# Patient Record
Sex: FEMALE | Race: WHITE | NOT HISPANIC OR LATINO | Employment: STUDENT | URBAN - METROPOLITAN AREA
[De-identification: names, ages, dates, MRNs, and addresses within clinical notes are randomized per-mention and may not be internally consistent; named-entity substitution may affect disease eponyms.]

---

## 2020-03-25 ENCOUNTER — TELEPHONE (OUTPATIENT)
Dept: OPHTHALMOLOGY | Facility: CLINIC | Age: 25
End: 2020-03-25

## 2020-05-27 ENCOUNTER — OFFICE VISIT (OUTPATIENT)
Dept: OPHTHALMOLOGY | Facility: CLINIC | Age: 25
End: 2020-05-27
Payer: MEDICAID

## 2020-05-27 DIAGNOSIS — H18.603 KERATOCONUS OF BOTH EYES: Primary | ICD-10-CM

## 2020-05-27 PROCEDURE — 92004 PR EYE EXAM, NEW PATIENT,COMPREHESV: ICD-10-PCS | Mod: S$PBB,,, | Performed by: OPHTHALMOLOGY

## 2020-05-27 PROCEDURE — 99212 OFFICE O/P EST SF 10 MIN: CPT | Mod: PBBFAC | Performed by: OPHTHALMOLOGY

## 2020-05-27 PROCEDURE — 92004 COMPRE OPH EXAM NEW PT 1/>: CPT | Mod: S$PBB,,, | Performed by: OPHTHALMOLOGY

## 2020-05-27 PROCEDURE — 99999 PR PBB SHADOW E&M-EST. PATIENT-LVL II: ICD-10-PCS | Mod: PBBFAC,,, | Performed by: OPHTHALMOLOGY

## 2020-05-27 PROCEDURE — 99999 PR PBB SHADOW E&M-EST. PATIENT-LVL II: CPT | Mod: PBBFAC,,, | Performed by: OPHTHALMOLOGY

## 2020-05-27 NOTE — PROGRESS NOTES
HPI     Patient here for keratoconus eval os. Patient states that she had   cross-linking done on right eye in 2018, which she believes has helped   with stabilizing vision.    NO EYE MEDS.    NO PREVIOUS EYE SX.    Last edited by Lolly García on 5/27/2020  2:57 PM. (History)            Assessment /Plan     For exam results, see Encounter Report.    Keratoconus of both eyes      S/P CXL OD at [facility] 2018  Progression OS so plan CXL OS soon.    The diagnosis of keratoconus and its etiology and clinical course were discussed.  Treatment with the use of specialty contact lenses, collagen cross linking, and corneal transplantation was explained in detail. This patient has never had LASIK.    This patient exhibits signs of progression of keratoconus and failure of conservative treatments with spectacles and/or contact lenses.   Disease progression is indicated by   - An increase of >1D in the Kmax  - An increase of >1D of astigmatism in the MRx  - An increase in myopia of >0.50D on MRx    I recommend treatment with Collagen Crosslinking using the [employer] FDA approved epi-off protocol with Photrexa and the KXL System, in order to stabilize this condition.    Plan:   KXL treatment OS  Has [place] Medicaid  Need Pentacam    I have informed the patient that we will seek PAP pre-approval, but in case of failure of the company to cover the cost of this medically necessary procedure, there may be a cost of $2,000 for the patient.

## 2020-06-18 ENCOUNTER — TELEPHONE (OUTPATIENT)
Dept: OPHTHALMOLOGY | Facility: CLINIC | Age: 25
End: 2020-06-18

## 2020-06-22 ENCOUNTER — TELEPHONE (OUTPATIENT)
Dept: OPHTHALMOLOGY | Facility: CLINIC | Age: 25
End: 2020-06-22

## 2020-06-22 NOTE — TELEPHONE ENCOUNTER
----- Message from Haylee Delgado sent at 6/22/2020 12:37 PM CDT -----  Contact: Pt @ [phone]  Pt is calling in reference to her Keratoconus(left eye) and is wanting to schedule the procedure.

## 2020-07-21 DIAGNOSIS — H18.609 KERATOCONUS, UNSPECIFIED LATERALITY: Primary | ICD-10-CM

## 2020-09-04 RX ORDER — PREDNISOLONE ACETATE 10 MG/ML
1 SUSPENSION/ DROPS OPHTHALMIC 4 TIMES DAILY
Qty: 1 BOTTLE | Refills: 1 | Status: SHIPPED | OUTPATIENT
Start: 2020-09-08 | End: 2021-09-08

## 2020-09-04 RX ORDER — OFLOXACIN 3 MG/ML
1 SOLUTION/ DROPS OPHTHALMIC 4 TIMES DAILY
Qty: 1 BOTTLE | Refills: 0 | Status: SHIPPED | OUTPATIENT
Start: 2020-09-08 | End: 2020-09-18

## 2020-09-10 ENCOUNTER — CLINICAL SUPPORT (OUTPATIENT)
Dept: OPHTHALMOLOGY | Facility: CLINIC | Age: 25
End: 2020-09-10
Payer: MEDICAID

## 2020-09-10 DIAGNOSIS — H18.609 KERATOCONUS, UNSPECIFIED LATERALITY: ICD-10-CM

## 2020-09-10 PROCEDURE — 99999 PR PBB SHADOW E&M-EST. PATIENT-LVL II: ICD-10-PCS | Mod: PBBFAC,,,

## 2020-09-10 PROCEDURE — 99212 OFFICE O/P EST SF 10 MIN: CPT | Mod: PBBFAC

## 2020-09-10 PROCEDURE — 99999 PR PBB SHADOW E&M-EST. PATIENT-LVL II: CPT | Mod: PBBFAC,,,

## 2020-09-14 NOTE — PROGRESS NOTES
HPI     Follow-up      Additional comments: CXL OS               Comments     26 YO female presents today for CXL OS.     Pred OS QID  Ocuflox OS QID           Last edited by Susy Varghese, PCT on 9/10/2020 11:14 AM. (History)            Assessment /Plan     For exam results, see Encounter Report.    Keratoconus, unspecified laterality  -     Collagen Cross-Linking

## 2020-09-17 ENCOUNTER — TELEPHONE (OUTPATIENT)
Dept: OPHTHALMOLOGY | Facility: CLINIC | Age: 25
End: 2020-09-17
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
## 2020-09-30 ENCOUNTER — OFFICE VISIT (OUTPATIENT)
Dept: OPHTHALMOLOGY | Facility: CLINIC | Age: 25
End: 2020-09-30
Attending: OPHTHALMOLOGY
Payer: MEDICAID

## 2020-09-30 DIAGNOSIS — H18.603 KERATOCONUS OF BOTH EYES: Primary | ICD-10-CM

## 2020-09-30 PROCEDURE — 99212 OFFICE O/P EST SF 10 MIN: CPT | Mod: PBBFAC | Performed by: OPHTHALMOLOGY

## 2020-09-30 PROCEDURE — 99999 PR PBB SHADOW E&M-EST. PATIENT-LVL II: CPT | Mod: PBBFAC,,, | Performed by: OPHTHALMOLOGY

## 2020-09-30 PROCEDURE — 92012 INTRM OPH EXAM EST PATIENT: CPT | Mod: S$PBB,,, | Performed by: OPHTHALMOLOGY

## 2020-09-30 PROCEDURE — 92012 PR EYE EXAM, EST PATIENT,INTERMED: ICD-10-PCS | Mod: S$PBB,,, | Performed by: OPHTHALMOLOGY

## 2020-09-30 PROCEDURE — 99999 PR PBB SHADOW E&M-EST. PATIENT-LVL II: ICD-10-PCS | Mod: PBBFAC,,, | Performed by: OPHTHALMOLOGY

## 2020-09-30 NOTE — PROGRESS NOTES
HPI     DLS 09/10/2020  Patient here for 2 week P/OP  Patient presents light sensitivity,tearing, hazy va pm.    Patient using PF TID OS    Last edited by Olivier Rodriguez on 9/30/2020  2:20 PM. (History)            Assessment /Plan     For exam results, see Encounter Report.    Keratoconus of both eyes      POW KXL OS    Epi healed completely. Looks great  No signs of infection.  Ok to d/c abx. Cont Pred bid for 1 month.  FU 1 month.  OD done in past.